# Patient Record
Sex: FEMALE | Race: BLACK OR AFRICAN AMERICAN | NOT HISPANIC OR LATINO | Employment: FULL TIME | URBAN - METROPOLITAN AREA
[De-identification: names, ages, dates, MRNs, and addresses within clinical notes are randomized per-mention and may not be internally consistent; named-entity substitution may affect disease eponyms.]

---

## 2017-09-24 ENCOUNTER — HOSPITAL ENCOUNTER (EMERGENCY)
Facility: HOSPITAL | Age: 32
Discharge: HOME | End: 2017-09-24
Attending: EMERGENCY MEDICINE
Payer: COMMERCIAL

## 2017-09-24 VITALS
RESPIRATION RATE: 16 BRPM | HEIGHT: 64 IN | TEMPERATURE: 98 F | SYSTOLIC BLOOD PRESSURE: 129 MMHG | DIASTOLIC BLOOD PRESSURE: 74 MMHG | WEIGHT: 248 LBS | OXYGEN SATURATION: 100 % | HEART RATE: 76 BPM | BODY MASS INDEX: 42.34 KG/M2

## 2017-09-24 DIAGNOSIS — R20.0 NUMBNESS OF TOES: Primary | ICD-10-CM

## 2017-09-24 LAB — POCT GLUCOSE: 90 MG/DL (ref 70–110)

## 2017-09-24 PROCEDURE — 82962 GLUCOSE BLOOD TEST: CPT

## 2017-09-24 PROCEDURE — 99283 EMERGENCY DEPT VISIT LOW MDM: CPT | Mod: 25

## 2017-09-25 NOTE — ED PROVIDER NOTES
Encounter Date: 9/24/2017       History     Chief Complaint   Patient presents with    Toe Pain     Numbess to Rt great toe x 1 week      32-year-old female with no past medical history presents with a chief complaint of right great toe numbness.  She reports her symptoms have been going on for approximately 1 week.  She reports that it has been persistent, and that she only really notices it when she is lying flat with her legs elevated.  She denies any associated trauma, swelling, skin color changes, or skin lesions.  The patient has not taken anything for relief of her symptoms.  There are no aggravating factors.          Review of patient's allergies indicates:   Allergen Reactions    Shellfish containing products Hives     History reviewed. No pertinent past medical history.  History reviewed. No pertinent surgical history.  History reviewed. No pertinent family history.  Social History   Substance Use Topics    Smoking status: Never Smoker    Smokeless tobacco: Never Used    Alcohol use No     Review of Systems   Constitutional: Negative for chills and fever.   Respiratory: Negative for shortness of breath.    Cardiovascular: Negative for chest pain.   Gastrointestinal: Negative for abdominal pain, nausea and vomiting.   Genitourinary: Negative for dysuria.   Musculoskeletal: Negative for back pain.   Skin: Negative for color change.   Neurological: Positive for numbness.   Psychiatric/Behavioral: Negative for confusion.       Physical Exam     Initial Vitals [09/24/17 0635]   BP Pulse Resp Temp SpO2   129/74 76 16 98.1 °F (36.7 °C) 100 %      MAP       92.33         Physical Exam    Nursing note and vitals reviewed.  Constitutional: She appears well-developed and well-nourished.   HENT:   Head: Normocephalic and atraumatic.   Eyes: EOM are normal. Pupils are equal, round, and reactive to light.   Neck: Neck supple.   Cardiovascular: Normal rate and regular rhythm.   Pulmonary/Chest: Breath sounds normal.    Abdominal: Soft. Bowel sounds are normal.   Neurological: She is alert and oriented to person, place, and time.   Skin: Skin is warm and dry.   Psychiatric: She has a normal mood and affect.         ED Course   Procedures  Labs Reviewed   POCT GLUCOSE             Medical Decision Making:   Initial Assessment:   32-year-old female presents with a chief complaint of right great toe numbness.  Differential Diagnosis:   Initial differential diagnosis included but not limited to peripheral neuropathy, new onset diabetes, and paresthesias.  ED Management:  The patient was urgently evaluated in the emergency Department, her evaluation was significant for a young female with a normal exam at present.  The patient's blood glucose and blood pressure were noted to be normal as well.  I do not believe the patient needs any further workup at this time.  She also has no signs of infection noted, and stable for discharge to home.  She will be referred to neurology as an outpatient for further workup of her symptoms.                   ED Course      Clinical Impression:   The encounter diagnosis was Numbness of toes.                           Rikki Middleton MD  09/25/17 0601